# Patient Record
Sex: FEMALE | Race: WHITE | NOT HISPANIC OR LATINO | Employment: OTHER | URBAN - METROPOLITAN AREA
[De-identification: names, ages, dates, MRNs, and addresses within clinical notes are randomized per-mention and may not be internally consistent; named-entity substitution may affect disease eponyms.]

---

## 2017-06-19 LAB
CHOLEST SERPL-MSCNC: 154 MG/DL (ref 0–200)
HDLC SERPL-MCNC: 60 MG/DL (ref 35–70)
LDL/HDL RATIO: 1.2 (ref 1–3)
LDLC SERPL CALC-MCNC: 72 MG/DL (ref 0–100)
TRIGL SERPL-MCNC: 109 MG/DL (ref 0–150)

## 2019-02-14 DIAGNOSIS — Z12.31 ENCOUNTER FOR SCREENING MAMMOGRAM FOR MALIGNANT NEOPLASM OF BREAST: Primary | ICD-10-CM

## 2019-05-31 ENCOUNTER — OFFICE VISIT (OUTPATIENT)
Dept: FAMILY MEDICINE | Facility: CLINIC | Age: 84
End: 2019-05-31
Payer: MEDICARE

## 2019-05-31 VITALS
HEIGHT: 64 IN | BODY MASS INDEX: 22.2 KG/M2 | HEART RATE: 66 BPM | OXYGEN SATURATION: 97 % | TEMPERATURE: 98 F | SYSTOLIC BLOOD PRESSURE: 156 MMHG | WEIGHT: 130 LBS | DIASTOLIC BLOOD PRESSURE: 74 MMHG

## 2019-05-31 DIAGNOSIS — R53.83 FATIGUE, UNSPECIFIED TYPE: ICD-10-CM

## 2019-05-31 DIAGNOSIS — H91.90 DECREASED HEARING, UNSPECIFIED LATERALITY: ICD-10-CM

## 2019-05-31 DIAGNOSIS — F51.01 PRIMARY INSOMNIA: Primary | ICD-10-CM

## 2019-05-31 DIAGNOSIS — M05.711 RHEUMATOID ARTHRITIS INVOLVING RIGHT SHOULDER WITH POSITIVE RHEUMATOID FACTOR: ICD-10-CM

## 2019-05-31 DIAGNOSIS — E03.9 ACQUIRED HYPOTHYROIDISM: ICD-10-CM

## 2019-05-31 PROCEDURE — 99214 PR OFFICE/OUTPT VISIT, EST, LEVL IV, 30-39 MIN: ICD-10-PCS | Mod: 25,S$GLB,, | Performed by: FAMILY MEDICINE

## 2019-05-31 PROCEDURE — 99214 OFFICE O/P EST MOD 30 MIN: CPT | Mod: 25,S$GLB,, | Performed by: FAMILY MEDICINE

## 2019-05-31 PROCEDURE — 96372 THER/PROPH/DIAG INJ SC/IM: CPT | Mod: S$GLB,,, | Performed by: FAMILY MEDICINE

## 2019-05-31 PROCEDURE — 96372 PR INJECTION,THERAP/PROPH/DIAG2ST, IM OR SUBCUT: ICD-10-PCS | Mod: S$GLB,,, | Performed by: FAMILY MEDICINE

## 2019-05-31 RX ORDER — NAPROXEN SODIUM 220 MG/1
TABLET, FILM COATED ORAL
COMMUNITY

## 2019-05-31 RX ORDER — TAMSULOSIN HYDROCHLORIDE 0.4 MG/1
1 CAPSULE ORAL DAILY
COMMUNITY

## 2019-05-31 RX ORDER — TRAZODONE HYDROCHLORIDE 100 MG/1
100 TABLET ORAL NIGHTLY
Qty: 30 TABLET | Refills: 5 | Status: SHIPPED | OUTPATIENT
Start: 2019-05-31 | End: 2020-01-27

## 2019-05-31 RX ORDER — DORZOLAMIDE HYDROCHLORIDE AND TIMOLOL MALEATE 20; 5 MG/ML; MG/ML
SOLUTION/ DROPS OPHTHALMIC
Refills: 4 | COMMUNITY
Start: 2019-05-04

## 2019-05-31 RX ORDER — CARVEDILOL 12.5 MG/1
1 TABLET ORAL 2 TIMES DAILY
COMMUNITY

## 2019-05-31 RX ORDER — CYANOCOBALAMIN 1000 UG/ML
1000 INJECTION, SOLUTION INTRAMUSCULAR; SUBCUTANEOUS
Status: COMPLETED | OUTPATIENT
Start: 2019-05-31 | End: 2019-05-31

## 2019-05-31 RX ADMIN — CYANOCOBALAMIN 1000 MCG: 1000 INJECTION, SOLUTION INTRAMUSCULAR; SUBCUTANEOUS at 09:05

## 2019-05-31 NOTE — PROGRESS NOTES
Subjective:       Patient ID: Angelia Anderson is a 88 y.o. female.    Chief Complaint: Follow-up    88-year-old female in for follow-up.  Patient complains of feeling tired and having no energy.  She does have a history of hypothyroidism and has been off her thyroid medication because of side effects so we will need to check her thyroid levels today.  She is also requesting a referral to see a rheumatologist due to history of rheumatoid arthritis she has been with severe pain in both hands and the right shoulder.  She was seen a rheumatologist in the past but she stopped seeing him about 1 year ago.  She also has a history of iron deficiency anemia but she sees a hematologist and her labs done a couple months ago showed that her anemia was well controlled she according to patient's daughter.  She also has been with trouble sleeping and is asking for medication to help her sleep.  Patient's daughter is also requesting a referral to Ear Nose and Throat specialist to have her hearing checked because she states that her mom's hearing is worsening.    Review of Systems   Constitutional: Positive for fatigue. Negative for fever.   HENT: Negative for ear pain, postnasal drip, rhinorrhea, sinus pain and sore throat.    Eyes: Negative for redness.   Respiratory: Negative for cough, chest tightness, shortness of breath and wheezing.    Cardiovascular: Negative for chest pain, palpitations and leg swelling.   Gastrointestinal: Negative for constipation, diarrhea, nausea and vomiting.   Genitourinary: Negative for difficulty urinating and dysuria.   Musculoskeletal: Positive for arthralgias and joint swelling.   Skin: Negative for rash.   Neurological: Negative for dizziness.   Psychiatric/Behavioral: Positive for sleep disturbance.       Objective:      Physical Exam   Constitutional: She is oriented to person, place, and time. She appears well-developed and well-nourished.   HENT:   Head: Normocephalic and atraumatic.   Right  Ear: Decreased hearing is noted.   Left Ear: Decreased hearing is noted.   Nose: Nose normal.   Mouth/Throat: Oropharynx is clear and moist and mucous membranes are normal.   Eyes: Pupils are equal, round, and reactive to light. Conjunctivae and EOM are normal.   Neck: Normal range of motion. Neck supple.   Cardiovascular: Normal rate, regular rhythm and normal heart sounds.   Pulmonary/Chest: Breath sounds normal. She has no wheezes. She has no rales.   Abdominal: Soft. Bowel sounds are normal. She exhibits no distension and no mass. There is no tenderness. There is no guarding.   Musculoskeletal: Normal range of motion. She exhibits no edema or tenderness.   Neurological: She is alert and oriented to person, place, and time. No cranial nerve deficit.   Skin: Skin is warm and dry. No rash noted. No erythema.   Psychiatric: She has a normal mood and affect. Her behavior is normal.   Nursing note and vitals reviewed.      Assessment:       1. Primary insomnia    2. Fatigue, unspecified type    3. Acquired hypothyroidism    4. Rheumatoid arthritis involving right shoulder with positive rheumatoid factor    5. Decreased hearing, unspecified laterality        Plan:     I will check labs in addition to checking thyroid labs I will also check labs for rheumatoid arthritis due to patient desiring a referral and I have no updated labs are status of this rheumatoid arthritis.  I will wait until labs have been receive before sending a referral to rheumatologist.   patient will be started on trazodone 100 mg at bedtime for insomnia.  She will also be referred to ENT for further evaluation of hearing.  She will be given a vitamin B12 injection today in clinic.  Follow-up in 6 months.

## 2019-06-01 LAB
ALBUMIN SERPL-MCNC: 4.1 G/DL (ref 3.5–5.2)
ALBUMIN/GLOB SERPL ELPH: 1.3 {RATIO} (ref 1–2.7)
ALP ISOS SERPL LEV INH-CCNC: 84 IU/L (ref 35–105)
ALT (SGPT): 12 U/L (ref 0–33)
ANION GAP SERPL CALC-SCNC: 7 MMOL/L (ref 8–17)
AST SERPL-CCNC: 19 U/L (ref 0–32)
BILIRUBIN, TOTAL: 0.27 MG/DL (ref 0–1.2)
BUN/CREAT SERPL: 19.6 (ref 6–20)
CALCIUM SERPL-MCNC: 9.7 MG/DL (ref 8.6–10.2)
CARBON DIOXIDE, CO2: 32 MMOL/L (ref 22–29)
CHLORIDE: 105 MMOL/L (ref 98–107)
CREAT SERPL-MCNC: 0.69 MG/DL (ref 0.5–0.9)
CRP QUALITATIVE: NEGATIVE MG/L
CRP QUANTITATIVE: 1.2 MG/L
GFR ESTIMATION: 80.29
GLOBULIN: 3.2 G/DL (ref 1.5–4.5)
GLUCOSE: 100 MG/DL (ref 82–115)
POTASSIUM: 4.7 MMOL/L (ref 3.5–5.1)
PROT SNV-MCNC: 7.3 G/DL (ref 6.4–8.3)
SED RATE (WESTERGREN): 8 MM/HR (ref 0–30)
SODIUM: 144 MMOL/L (ref 136–145)
T4, FREE: 0.9 NG/DL (ref 0.93–1.7)
TSH SERPL DL<=0.005 MIU/L-ACNC: 5.8 UIU/ML (ref 0.27–4.2)
UREA NITROGEN (BUN): 13.5 MG/DL (ref 8–23)

## 2019-06-04 ENCOUNTER — TELEPHONE (OUTPATIENT)
Dept: FAMILY MEDICINE | Facility: CLINIC | Age: 84
End: 2019-06-04

## 2019-06-04 DIAGNOSIS — M05.711 RHEUMATOID ARTHRITIS INVOLVING RIGHT SHOULDER WITH POSITIVE RHEUMATOID FACTOR: Primary | ICD-10-CM

## 2019-06-19 ENCOUNTER — OFFICE VISIT (OUTPATIENT)
Dept: FAMILY MEDICINE | Facility: CLINIC | Age: 84
End: 2019-06-19
Payer: MEDICARE

## 2019-06-19 VITALS
BODY MASS INDEX: 24.89 KG/M2 | DIASTOLIC BLOOD PRESSURE: 72 MMHG | OXYGEN SATURATION: 96 % | WEIGHT: 145 LBS | SYSTOLIC BLOOD PRESSURE: 147 MMHG | TEMPERATURE: 97 F | HEART RATE: 77 BPM

## 2019-06-19 DIAGNOSIS — J32.8 OTHER CHRONIC SINUSITIS: ICD-10-CM

## 2019-06-19 DIAGNOSIS — J06.9 UPPER RESPIRATORY INFECTION WITH COUGH AND CONGESTION: Primary | ICD-10-CM

## 2019-06-19 PROBLEM — M72.0 DUPUYTREN'S DISEASE OF PALM: Status: ACTIVE | Noted: 2019-06-19

## 2019-06-19 PROBLEM — M72.0 DUPUYTREN'S DISEASE OF PALM: Status: RESOLVED | Noted: 2019-06-19 | Resolved: 2019-06-19

## 2019-06-19 PROCEDURE — 99213 OFFICE O/P EST LOW 20 MIN: CPT | Mod: 25,S$GLB,, | Performed by: NURSE PRACTITIONER

## 2019-06-19 PROCEDURE — 96372 PR INJECTION,THERAP/PROPH/DIAG2ST, IM OR SUBCUT: ICD-10-PCS | Mod: S$GLB,,, | Performed by: NURSE PRACTITIONER

## 2019-06-19 PROCEDURE — 99213 PR OFFICE/OUTPT VISIT, EST, LEVL III, 20-29 MIN: ICD-10-PCS | Mod: 25,S$GLB,, | Performed by: NURSE PRACTITIONER

## 2019-06-19 PROCEDURE — 96372 THER/PROPH/DIAG INJ SC/IM: CPT | Mod: S$GLB,,, | Performed by: NURSE PRACTITIONER

## 2019-06-19 RX ORDER — ATORVASTATIN CALCIUM 40 MG/1
TABLET, FILM COATED ORAL
COMMUNITY
End: 2019-08-27

## 2019-06-19 RX ORDER — AZITHROMYCIN 250 MG/1
TABLET, FILM COATED ORAL
Qty: 6 TABLET | Refills: 0 | Status: SHIPPED | OUTPATIENT
Start: 2019-06-19 | End: 2019-06-24

## 2019-06-19 RX ORDER — METHYLPREDNISOLONE ACETATE 40 MG/ML
40 INJECTION, SUSPENSION INTRA-ARTICULAR; INTRALESIONAL; INTRAMUSCULAR; SOFT TISSUE
Status: COMPLETED | OUTPATIENT
Start: 2019-06-19 | End: 2019-06-19

## 2019-06-19 RX ORDER — MECLIZINE HYDROCHLORIDE 25 MG/1
TABLET ORAL
Refills: 0 | COMMUNITY
Start: 2019-06-09 | End: 2019-06-19

## 2019-06-19 RX ORDER — PREDNISONE 10 MG/1
TABLET ORAL
COMMUNITY
Start: 2019-06-12 | End: 2019-08-27

## 2019-06-19 RX ORDER — ERYTHROMYCIN 5 MG/G
OINTMENT OPHTHALMIC
COMMUNITY

## 2019-06-19 RX ORDER — BENZONATATE 200 MG/1
200 CAPSULE ORAL EVERY 8 HOURS
Refills: 0 | COMMUNITY
Start: 2019-06-09 | End: 2019-08-27

## 2019-06-19 RX ORDER — GUAIFENESIN AND PHENYLEPHRINE HCL 385; 10 MG/1; MG/1
TABLET ORAL
Refills: 0 | COMMUNITY
Start: 2019-06-09 | End: 2019-08-27

## 2019-06-19 RX ADMIN — METHYLPREDNISOLONE ACETATE 40 MG: 40 INJECTION, SUSPENSION INTRA-ARTICULAR; INTRALESIONAL; INTRAMUSCULAR; SOFT TISSUE at 10:06

## 2019-06-19 NOTE — PROGRESS NOTES
Clinic Note  6/19/2019      Subjective:       Patient ID:  Angelia is a 88 y.o. female being seen for an established sick visit.    Chief Complaint: URI and Cough    HPI      Angelia Anderson is a 88 y.o. female who presents for evaluation of upper respiratory infection. Symptoms include: congestion, cough and post nasal drip. Onset of symptoms was 2 weeks ago. Symptoms have been gradually improving since that time. Past history is significant for chronic sinusitis and URI. Patient is a non-smoker.  Upper Respiratory Infection  Patient complains of symptoms of a URI. Symptoms include congestion, cough described as post nasal drip productive cough with yellow and green colored sputum. Onset of symptoms was 2 weeks ago, and has been only slightly gradually improving since that time. States she went to urgent care and was only given meclizine for dizziness, which made her symptoms worse. States she self discontinued meclizine. Treatment to date: none. Denies OTC treatment.      Review of Systems   Constitutional: Positive for malaise/fatigue. Negative for chills and diaphoresis.        Chronic fatigue.   HENT: Positive for congestion. Negative for ear discharge, ear pain, sinus pain and tinnitus.         Chronic sinusitis and recurrent URI. Referred to ENT per Dr. Mc, awaiting appointment.   Eyes: Negative for discharge.   Respiratory: Positive for cough, sputum production and wheezing.         Cough, yellow/green sputum x 2 weeks.   Gastrointestinal: Negative for diarrhea, nausea and vomiting.   Musculoskeletal: Positive for joint pain and myalgias.        Chronic, RA. Awaiting appointment with Dr. Caballero   Neurological: Positive for headaches.        Occasional headaches, relieved with Anicin OTC.   Psychiatric/Behavioral: The patient has insomnia.         Chronic. Stable with medications.          Medication List with Changes/Refills   New Medications    AZITHROMYCIN (Z-WALTER) 250 MG TABLET    Take 2 tablets by  "mouth on day 1; Take 1 tablet by mouth on days 2-5   Current Medications    ASPIRIN 81 MG CHEW    aspirin 81 mg chewable tablet   Chew 1 tablet every day by oral route.    ATORVASTATIN (LIPITOR) 40 MG TABLET    atorvastatin 40 mg tablet    BENZONATATE (TESSALON) 200 MG CAPSULE    Take 200 mg by mouth every 8 (eight) hours.    CARVEDILOL (COREG) 12.5 MG TABLET    1 tablet 2 (two) times daily.    DECONEX IR  MG TAB    TAKE 1 TABLET BY MOUTH 3-4 TIMES DAILY    DORZOLAMIDE-TIMOLOL 2-0.5% (COSOPT) 22.3-6.8 MG/ML OPHTHALMIC SOLUTION    INSTILL 1 DROP INTO RIGHT EYE TWICE A DAY    ERYTHROMYCIN (ROMYCIN) OPHTHALMIC OINTMENT    erythromycin 5 mg/gram (0.5 %) eye ointment    PREDNISONE (DELTASONE) 10 MG TABLET        TAMSULOSIN (FLOMAX) 0.4 MG CAP    1 tablet once daily.    TRAZODONE (DESYREL) 100 MG TABLET    Take 1 tablet (100 mg total) by mouth every evening.   Discontinued Medications    MECLIZINE (ANTIVERT) 25 MG TABLET    TAKE 1 TABLET BY MOUTH EVERY 6-8 HOURS       Patient Active Problem List   Diagnosis    Other chronic sinusitis    Upper respiratory infection with cough and congestion           Objective:      BP (!) 147/72 (BP Location: Left arm, Patient Position: Sitting, BP Method: Medium (Automatic))   Pulse 77   Temp 97.2 °F (36.2 °C) (Temporal)   Wt 65.8 kg (145 lb)   SpO2 96%   BMI 24.89 kg/m²   Estimated body mass index is 24.89 kg/m² as calculated from the following:    Height as of 5/31/19: 5' 4" (1.626 m).    Weight as of this encounter: 65.8 kg (145 lb).  Physical Exam   Constitutional: She is oriented to person, place, and time and well-developed, well-nourished, and in no distress. No distress.   HENT:   Head: Normocephalic and atraumatic.   Right Ear: External ear normal.   Left Ear: External ear normal.   Nose: Nose normal.   Mouth/Throat: Oropharynx is clear and moist. No oropharyngeal exudate.   Eyes: Conjunctivae are normal. Right eye exhibits no discharge. Left eye exhibits no " discharge. No scleral icterus.   Neck: Normal range of motion. Neck supple. No JVD present. No tracheal deviation present.   Cardiovascular: Normal rate, regular rhythm and normal heart sounds.   Pulmonary/Chest: Effort normal and breath sounds normal. No respiratory distress. She has no wheezes. She has no rales.   Abdominal: Soft. Bowel sounds are normal. She exhibits no distension. There is no tenderness.   Neurological: She is alert and oriented to person, place, and time. Gait normal. GCS score is 15.   Skin: Skin is warm and dry. She is not diaphoretic. No erythema. No pallor.   Psychiatric: Mood, affect and judgment normal.         Assessment and Plan:   Upper respiratory infection    Zithromax prescribed.   Steroids intramuscular given: Depo Medrol 40mg IM in clinic.  Keep appointment with ENT  Keep appointment with Rheumatologist.  Follow up with Dr. Mc as scheduled and as needed.      Problem List Items Addressed This Visit        ENT    Other chronic sinusitis    Upper respiratory infection with cough and congestion - Primary    Relevant Medications    methylPREDNISolone acetate injection 40 mg    azithromycin (Z-WALTER) 250 MG tablet          Follow Up:   Follow up if symptoms worsen or fail to improve.    Other Orders Placed This Visit:  No orders of the defined types were placed in this encounter.        Aline Cohen    Problem List Items Addressed This Visit        ENT    Other chronic sinusitis    Upper respiratory infection with cough and congestion - Primary    Relevant Medications    methylPREDNISolone acetate injection 40 mg    azithromycin (Z-WALTER) 250 MG tablet

## 2019-06-19 NOTE — PATIENT INSTRUCTIONS
Preventing Common Respiratory Infections  Respiratory infections such as colds and influenza (the flu) are common in winter. These infections are often caused by viruses. They may share some symptoms, but not all respiratory infections are the same. Some make you more sick than others. You can take steps to prevent common respiratory infections. And if you get sick, you can take care of yourself to keep the infection from getting worse.    What is a cold?  · Symptoms include runny nose, coughing and sneezing, and sore throat. Cold symptoms tend to be milder than flu symptoms.  · Symptoms tend to come on slowly. They last for a few days to about a week.  · With a cold, you can still do most of the things you usually do.  What is the flu?  · Symptoms include fever, headache, fatigue, cough, sore throat, runny nose, and muscle aches. Children may have upset stomach and vomiting, but adults usually dont.  · Symptoms tend to come on quickly. Some, such as fatigue and cough, can last a few weeks.  · With the flu, you may feel worn out and not able to do normal activities.  · Its most likely NOT the flu if an adult has vomiting or diarrhea for a day or two. This so-called stomach flu is probably a GI (gastrointestinal) infection.  When the infection gets worse  Without proper care, a respiratory infection can get worse. It can lead to serious complications and death. If you arent getting better, call your healthcare provider. Complications can include:  · Bronchitis (infection of the airways that leads to shortness of breath and coughing up thick yellow or green mucus)  · Pneumonia (infection of the lungs in which fluid and mucus settle in the lungs, making breathing difficult)  · Worsening of chronic conditions such as heart failure, chronic lung disease, asthma, or diabetes  · Severe dehydration (loss of fluids)  · Sinus problems  · Ear infections   Get a flu vaccine  A flu vaccine protects you from influenza  (but not other colds or infections). Get a vaccine each fall, before flu season starts. This can be done at a clinic, healthcare providers office, drugstore, senior center, or through your workplace.  Get pneumococcal vaccines  Pneumonia can be a complication of influenza. There are 2 pneumococcal pneumonia vaccines that protect against many types of pneumonia. Talk with your healthcare provider about these important vaccines.   Keep germs from spreading  No one likes getting sick. To protect yourself and others from cold and flu germs:  · Wash your hands often. Use alcohol-based hand  when you dont have access to soap and water.  · Dont touch your eyes, nose, and mouth. This may help you keep germs out of your body.  · Try to avoid people with respiratory infections. You may want to stay out of crowds during flu season (winter).  · Ask your healthcare provider if you should get a pneumonia vaccination.  How to wash your hands  · Use warm water and plenty of soap. Work up a good lather.  · Clean your whole hand, under your nails, between your fingers, and up your wrists. Wash for at least 15 to 20 seconds. Dont just wipe--rub well.  · Rinse. Let the water run down your fingertips, not up your wrists.  · In a public restroom, use a paper towel to turn off the faucet and open the door.   Date Last Reviewed: 12/1/2016  © 4403-0498 [employer] [address] [place] [address], [place] [address]. All rights reserved. This information is not intended as a substitute for professional medical care. Always follow your healthcare professional's instructions.

## 2019-07-24 ENCOUNTER — OFFICE VISIT (OUTPATIENT)
Dept: FAMILY MEDICINE | Facility: CLINIC | Age: 84
End: 2019-07-24
Payer: MEDICARE

## 2019-07-24 VITALS
SYSTOLIC BLOOD PRESSURE: 177 MMHG | OXYGEN SATURATION: 97 % | TEMPERATURE: 98 F | BODY MASS INDEX: 24.92 KG/M2 | DIASTOLIC BLOOD PRESSURE: 71 MMHG | WEIGHT: 146 LBS | HEIGHT: 64 IN | HEART RATE: 69 BPM

## 2019-07-24 DIAGNOSIS — I10 ESSENTIAL HYPERTENSION: Primary | ICD-10-CM

## 2019-07-24 DIAGNOSIS — D50.0 IRON DEFICIENCY ANEMIA DUE TO CHRONIC BLOOD LOSS: ICD-10-CM

## 2019-07-24 DIAGNOSIS — E03.9 ACQUIRED HYPOTHYROIDISM: ICD-10-CM

## 2019-07-24 PROCEDURE — 99213 PR OFFICE/OUTPT VISIT, EST, LEVL III, 20-29 MIN: ICD-10-PCS | Mod: S$GLB,,, | Performed by: FAMILY MEDICINE

## 2019-07-24 PROCEDURE — 99213 OFFICE O/P EST LOW 20 MIN: CPT | Mod: S$GLB,,, | Performed by: FAMILY MEDICINE

## 2019-07-24 NOTE — PROGRESS NOTES
Subjective:       Patient ID: Angelia Anderson is a 88 y.o. female.    Chief Complaint: Follow-up    89 yo female in for follow up.  She states that she has has been doing well.  She states that she only takes the carvedilol twice a day and the iron pill daily.  She     Review of Systems   Constitutional: Negative for fever.   HENT: Negative for ear pain, postnasal drip, rhinorrhea, sinus pain and sore throat.    Eyes: Negative for redness.   Respiratory: Negative for cough, chest tightness, shortness of breath and wheezing.    Cardiovascular: Negative for chest pain, palpitations and leg swelling.   Gastrointestinal: Negative for constipation, diarrhea, nausea and vomiting.   Genitourinary: Negative for difficulty urinating and dysuria.   Musculoskeletal: Negative for arthralgias.   Skin: Negative for rash.   Neurological: Negative for dizziness.       Objective:      Physical Exam   Constitutional: She is oriented to person, place, and time. Vital signs are normal. She appears well-developed and well-nourished.   HENT:   Head: Normocephalic and atraumatic.   Right Ear: Hearing and tympanic membrane normal.   Left Ear: Hearing and tympanic membrane normal.   Nose: Nose normal.   Mouth/Throat: Oropharynx is clear and moist.   Eyes: Pupils are equal, round, and reactive to light. Conjunctivae and EOM are normal.   Neck: Normal range of motion. Neck supple.   Cardiovascular: Normal rate, regular rhythm and normal heart sounds.   Pulmonary/Chest: Effort normal and breath sounds normal. She has no wheezes. She has no rales.   Abdominal: Soft. Normal appearance and bowel sounds are normal. She exhibits no distension and no mass. There is no tenderness. There is no guarding.   Musculoskeletal: Normal range of motion. She exhibits no edema.        Left hand: She exhibits tenderness and bony tenderness.   Neurological: She is alert and oriented to person, place, and time. No cranial nerve deficit.   Skin: Skin is warm and  dry. No rash noted. No erythema.   Psychiatric: She has a normal mood and affect. Her behavior is normal.   Nursing note and vitals reviewed.      Assessment:       1. Essential hypertension    2. Iron deficiency anemia due to chronic blood loss    3. Acquired hypothyroidism        Plan:     Hypertension is chronic and controlled on carvedilol.  Anemia is chronic and controlled with daily iron supplement.  She is subclinical but will monitor for now because of side effects from too much medication previously.

## 2019-08-26 NOTE — PROGRESS NOTES
Clinic Note  8/27/2019      Subjective:       Patient ID:  Angelia is a 88 y.o. female being seen for an established visit.    Chief Complaint: Follow-up (sick visit)    HPI  Angelia Anderson is a 88 y.o. female who presents for evaluation of chronic sinus problems. Symptoms include cough, post nasal drip, rhinorrhea and throat clearing. Patient has had approximately 6 acute sinus infections in the past year. Patient admits to a history of allergic rhinitis. Patient denies chronic use of OTC nasal decongestant sprays. Previous treatment has included nasal steroids and oral decongestants, which have given no relief. Patient is a non-smoker.      Review of Systems   Constitutional: Negative for chills, diaphoresis, fever and malaise/fatigue.   HENT: Positive for congestion, hearing loss and sore throat. Negative for ear discharge, ear pain and sinus pain.         Bilateral hearing loss, followed by ENT. Nasal congestion, clear, chronic.   Eyes: Positive for discharge. Negative for double vision, pain and redness.        Blind in left eye, chronic. Clear, watery discharge, chronic.   Respiratory: Positive for cough and sputum production. Negative for shortness of breath and wheezing.         Cough, worse in the morning and at night. Sputum, clear and thick.   Neurological: Positive for headaches.        Sinus headache, chronic problem, stable with OTC pain relief.          Medication List with Changes/Refills   New Medications    LORATADINE (CLARITIN) 10 MG TABLET    Take 1 tablet (10 mg total) by mouth once daily.    METHYLPREDNISOLONE (MEDROL DOSEPACK) 4 MG TABLET    use as directed    MONTELUKAST (SINGULAIR) 10 MG TABLET    Take 1 tablet (10 mg total) by mouth every evening.   Current Medications    ASPIRIN 81 MG CHEW    aspirin 81 mg chewable tablet   Chew 1 tablet every day by oral route.    CARVEDILOL (COREG) 12.5 MG TABLET    1 tablet 2 (two) times daily.    DECONEX IR  MG TAB    TAKE 1 TABLET BY MOUTH 3-4  "TIMES DAILY    DORZOLAMIDE-TIMOLOL 2-0.5% (COSOPT) 22.3-6.8 MG/ML OPHTHALMIC SOLUTION    INSTILL 1 DROP INTO RIGHT EYE TWICE A DAY    ERYTHROMYCIN (ROMYCIN) OPHTHALMIC OINTMENT    erythromycin 5 mg/gram (0.5 %) eye ointment    FERROUS SULFATE (IRON ORAL)    Take 10 mg by mouth once daily.    TAMSULOSIN (FLOMAX) 0.4 MG CAP    1 tablet once daily.    TRAZODONE (DESYREL) 100 MG TABLET    Take 1 tablet (100 mg total) by mouth every evening.   Discontinued Medications    ATORVASTATIN (LIPITOR) 40 MG TABLET    atorvastatin 40 mg tablet    BENZONATATE (TESSALON) 200 MG CAPSULE    Take 200 mg by mouth every 8 (eight) hours.    PREDNISONE (DELTASONE) 10 MG TABLET           Patient Active Problem List   Diagnosis    Other chronic sinusitis    Upper respiratory infection with cough and congestion    Essential hypertension           Objective:      BP (!) 146/79 (BP Location: Left arm, Patient Position: Sitting, BP Method: Medium (Automatic))   Pulse 70   Temp 97.9 °F (36.6 °C)   Ht 5' 4" (1.626 m)   Wt 67.3 kg (148 lb 6 oz)   SpO2 98%   BMI 25.47 kg/m²   Estimated body mass index is 25.47 kg/m² as calculated from the following:    Height as of this encounter: 5' 4" (1.626 m).    Weight as of this encounter: 67.3 kg (148 lb 6 oz).  Physical Exam   Constitutional: She is oriented to person, place, and time and well-developed, well-nourished, and in no distress. No distress.   HENT:   Head: Normocephalic and atraumatic.   Right Ear: External ear normal.   Left Ear: External ear normal.   Nose: Mucosal edema and rhinorrhea present. Right sinus exhibits no maxillary sinus tenderness and no frontal sinus tenderness. Left sinus exhibits no maxillary sinus tenderness and no frontal sinus tenderness.   Mouth/Throat: Uvula is midline and mucous membranes are normal. Posterior oropharyngeal erythema present. No posterior oropharyngeal edema.   Eyes: Right eye exhibits no discharge. Left eye exhibits no discharge. "   Cardiovascular: Normal rate, regular rhythm and normal heart sounds.   No murmur heard.  Pulmonary/Chest: Effort normal and breath sounds normal. No respiratory distress. She has no wheezes.   Neurological: She is alert and oriented to person, place, and time. Gait normal. GCS score is 15.   Skin: Skin is warm and dry. No rash noted. She is not diaphoretic. No erythema.   Psychiatric: Mood, memory, affect and judgment normal.         Assessment and Plan:   Chronic sinusitis    Make appointment with Dr. Tsai ENT  OTC Claritin 10 mg by mouth daily  Medrol Dose pack as prescribed  Singular 10 mg by mouth at bedtime  Follow up as scheduled and as needed.      Problem List Items Addressed This Visit        ENT    Other chronic sinusitis - Primary    Relevant Medications    montelukast (SINGULAIR) 10 mg tablet    methylPREDNISolone (MEDROL DOSEPACK) 4 mg tablet    loratadine (CLARITIN) 10 mg tablet          Follow Up:   Follow up if symptoms worsen or fail to improve.    Other Orders Placed This Visit:  No orders of the defined types were placed in this encounter.        Aline Cohen    Problem List Items Addressed This Visit        ENT    Other chronic sinusitis - Primary    Relevant Medications    montelukast (SINGULAIR) 10 mg tablet    methylPREDNISolone (MEDROL DOSEPACK) 4 mg tablet    loratadine (CLARITIN) 10 mg tablet

## 2019-08-27 ENCOUNTER — OFFICE VISIT (OUTPATIENT)
Dept: FAMILY MEDICINE | Facility: CLINIC | Age: 84
End: 2019-08-27
Payer: MEDICARE

## 2019-08-27 VITALS
BODY MASS INDEX: 25.33 KG/M2 | OXYGEN SATURATION: 98 % | HEIGHT: 64 IN | DIASTOLIC BLOOD PRESSURE: 79 MMHG | SYSTOLIC BLOOD PRESSURE: 146 MMHG | TEMPERATURE: 98 F | WEIGHT: 148.38 LBS | HEART RATE: 70 BPM

## 2019-08-27 DIAGNOSIS — J32.8 OTHER CHRONIC SINUSITIS: Primary | ICD-10-CM

## 2019-08-27 PROCEDURE — 99212 OFFICE O/P EST SF 10 MIN: CPT | Mod: S$GLB,,, | Performed by: NURSE PRACTITIONER

## 2019-08-27 PROCEDURE — 99212 PR OFFICE/OUTPT VISIT, EST, LEVL II, 10-19 MIN: ICD-10-PCS | Mod: S$GLB,,, | Performed by: NURSE PRACTITIONER

## 2019-08-27 RX ORDER — LORATADINE 10 MG/1
10 TABLET ORAL DAILY
Qty: 30 TABLET | Refills: 5 | Status: SHIPPED | OUTPATIENT
Start: 2019-08-27 | End: 2020-01-27

## 2019-08-27 RX ORDER — MONTELUKAST SODIUM 10 MG/1
10 TABLET ORAL NIGHTLY
Qty: 30 TABLET | Refills: 5 | Status: SHIPPED | OUTPATIENT
Start: 2019-08-27 | End: 2020-01-27

## 2019-08-27 RX ORDER — METHYLPREDNISOLONE 4 MG/1
TABLET ORAL
Qty: 1 PACKAGE | Refills: 0 | Status: SHIPPED | OUTPATIENT
Start: 2019-08-27 | End: 2019-09-17

## 2019-09-05 DIAGNOSIS — J06.9 UPPER RESPIRATORY INFECTION WITH COUGH AND CONGESTION: Primary | ICD-10-CM

## 2019-09-05 RX ORDER — GUAIFENESIN/DEXTROMETHORPHAN 100-10MG/5
10 SYRUP ORAL EVERY 6 HOURS PRN
Qty: 236 ML | Refills: 0 | Status: SHIPPED | OUTPATIENT
Start: 2019-09-05 | End: 2019-09-15

## 2019-09-05 RX ORDER — AMOXICILLIN AND CLAVULANATE POTASSIUM 875; 125 MG/1; MG/1
1 TABLET, FILM COATED ORAL 2 TIMES DAILY
Qty: 20 TABLET | Refills: 0 | Status: SHIPPED | OUTPATIENT
Start: 2019-09-05 | End: 2019-09-15

## 2019-09-09 ENCOUNTER — TELEPHONE (OUTPATIENT)
Dept: FAMILY MEDICINE | Facility: CLINIC | Age: 84
End: 2019-09-09

## 2019-09-09 DIAGNOSIS — R53.1 WEAKNESS GENERALIZED: ICD-10-CM

## 2019-09-09 DIAGNOSIS — M05.711 RHEUMATOID ARTHRITIS INVOLVING RIGHT SHOULDER WITH POSITIVE RHEUMATOID FACTOR: Primary | ICD-10-CM

## 2019-09-09 NOTE — TELEPHONE ENCOUNTER
----- Message from Ana Liriano sent at 9/9/2019 11:37 AM CDT -----  Contact: daughter Kika [phone]  When patient was seen in July with Dr Mc they discussed having [facility] for her mom since she doesn't want to get out of bed

## 2019-09-24 ENCOUNTER — TELEPHONE (OUTPATIENT)
Dept: FAMILY MEDICINE | Facility: CLINIC | Age: 84
End: 2019-09-24

## 2019-09-24 NOTE — TELEPHONE ENCOUNTER
----- Message from Ana Liriano sent at 9/23/2019  3:35 PM CDT -----  Contact: Kika daughter [phone]  Patients daughter had [employer] tickets but had to cancel since her mom was sick & has Alzheimers. Wanted to know if Dr Mc could write a note stating that she needed to care for her mom

## 2019-09-25 ENCOUNTER — OFFICE VISIT (OUTPATIENT)
Dept: FAMILY MEDICINE | Facility: CLINIC | Age: 84
End: 2019-09-25
Payer: MEDICARE

## 2019-09-25 VITALS
TEMPERATURE: 97 F | BODY MASS INDEX: 25.61 KG/M2 | SYSTOLIC BLOOD PRESSURE: 143 MMHG | HEIGHT: 64 IN | OXYGEN SATURATION: 97 % | DIASTOLIC BLOOD PRESSURE: 67 MMHG | HEART RATE: 70 BPM | WEIGHT: 150 LBS

## 2019-09-25 DIAGNOSIS — J32.8 OTHER CHRONIC SINUSITIS: Primary | ICD-10-CM

## 2019-09-25 PROCEDURE — 99213 PR OFFICE/OUTPT VISIT, EST, LEVL III, 20-29 MIN: ICD-10-PCS | Mod: ICN,CMP,S$GLB, | Performed by: NURSE PRACTITIONER

## 2019-09-25 PROCEDURE — 99213 OFFICE O/P EST LOW 20 MIN: CPT | Mod: ICN,CMP,S$GLB, | Performed by: NURSE PRACTITIONER

## 2019-09-25 NOTE — PROGRESS NOTES
Clinic Note  9/25/2019      Subjective:       Patient ID:  Angelia is a 88 y.o. female being seen for an established visit.    Chief Complaint: Fever (pt c/o fever at the highest of 102 x1.5 weeks) and Nasal Congestion    HPI  Angelia is an 88 year old female in clinic today with complaints of fever > 102 for 1 to 1.5 weeks. She is afebrile in clinic today. Reports chronic post nasal drip, chronic sinusitis, nasal congestion. Followed by ENT. Daughter states that at last visit with ENT no further medication or testing was needed at this time. Today patient denies cough or wheezing. Report generally feeling well overall.    Review of Systems   Constitutional: Negative for chills, fever, malaise/fatigue and weight loss.        Daughter reports temperature between  the last few days. Afebrile in clinic today.   HENT: Positive for congestion. Negative for ear discharge, ear pain, sinus pain and sore throat.         Nasal congestion, chronic, followed by ENT   Respiratory: Negative for cough, sputum production, shortness of breath and wheezing.    Skin: Negative for itching and rash.   Neurological: Negative for dizziness, seizures, loss of consciousness and headaches.       Medication List with Changes/Refills   Current Medications    ASPIRIN 81 MG CHEW    aspirin 81 mg chewable tablet   Chew 1 tablet every day by oral route.    CARVEDILOL (COREG) 12.5 MG TABLET    1 tablet 2 (two) times daily.    DORZOLAMIDE-TIMOLOL 2-0.5% (COSOPT) 22.3-6.8 MG/ML OPHTHALMIC SOLUTION    INSTILL 1 DROP INTO RIGHT EYE TWICE A DAY    ERYTHROMYCIN (ROMYCIN) OPHTHALMIC OINTMENT    erythromycin 5 mg/gram (0.5 %) eye ointment    FERROUS SULFATE (IRON ORAL)    Take 10 mg by mouth once daily.    LORATADINE (CLARITIN) 10 MG TABLET    Take 1 tablet (10 mg total) by mouth once daily.    MONTELUKAST (SINGULAIR) 10 MG TABLET    Take 1 tablet (10 mg total) by mouth every evening.    TAMSULOSIN (FLOMAX) 0.4 MG CAP    1 tablet once daily.     "TRAZODONE (DESYREL) 100 MG TABLET    Take 1 tablet (100 mg total) by mouth every evening.       Patient Active Problem List   Diagnosis    Other chronic sinusitis    Upper respiratory infection with cough and congestion    Essential hypertension           Objective:      BP (!) 143/67 (BP Location: Left arm, Patient Position: Sitting, BP Method: Medium (Automatic))   Pulse 70   Temp 97.3 °F (36.3 °C) (Temporal)   Ht 5' 4" (1.626 m)   Wt 68 kg (150 lb)   SpO2 97%   BMI 25.75 kg/m²   Estimated body mass index is 25.75 kg/m² as calculated from the following:    Height as of this encounter: 5' 4" (1.626 m).    Weight as of this encounter: 68 kg (150 lb).  Physical Exam   Constitutional: She is oriented to person, place, and time and well-developed, well-nourished, and in no distress. She appears not lethargic and not dehydrated. She appears healthy.  Non-toxic appearance. She does not have a sickly appearance. No distress.   HENT:   Head: Normocephalic and atraumatic.   Right Ear: External ear normal.   Mouth/Throat: Oropharynx is clear and moist. No oropharyngeal exudate.   Eyes: Conjunctivae are normal. Right eye exhibits no discharge. Left eye exhibits no discharge. No scleral icterus.   Cardiovascular: Normal rate, regular rhythm and normal heart sounds. Exam reveals no friction rub.   No murmur heard.  Pulmonary/Chest: Effort normal and breath sounds normal. No respiratory distress. She has no wheezes. She has no rales.   Neurological: She is oriented to person, place, and time. She appears not lethargic. Gait normal. GCS score is 15.   Skin: Skin is warm and dry. No rash noted. She is not diaphoretic. No erythema.   Psychiatric: Mood, memory, affect and judgment normal.   Nursing note and vitals reviewed.        Assessment and Plan:   Chronic sinusitis, viral       Encouraged increase oral hydration  Nasal saline sprays.  Nasal steroids per medication orders.  Antihistamines: trial of OTC Zyrtec QHS x 1 " month  Hold loratadine during trial period  Call office if fever consistently >101  Follow up as scheduled and as needed    Problem List Items Addressed This Visit        ENT    Other chronic sinusitis - Primary          Follow Up:   Follow up if symptoms worsen or fail to improve.    Other Orders Placed This Visit:  No orders of the defined types were placed in this encounter.        Aline Cohen    Problem List Items Addressed This Visit        ENT    Other chronic sinusitis - Primary

## 2019-10-09 ENCOUNTER — TELEPHONE (OUTPATIENT)
Dept: FAMILY MEDICINE | Facility: CLINIC | Age: 84
End: 2019-10-09

## 2019-10-09 NOTE — TELEPHONE ENCOUNTER
----- Message from Ana Liriano sent at 10/9/2019  2:04 PM CDT -----  Contact: daughter   Kika [phone]  Wants to discuss the homehealth therapist

## 2019-10-10 NOTE — TELEPHONE ENCOUNTER
Patient's daughter states that she was calling to ask about the letter you said you would write once you returned from [place]. States that it is needed to get a refund from [employer] for a ticket she purchased before her mother got ill and she couldn't leave her mother unattended. Would like to be called to pick the letter up once completed.

## 2019-10-11 ENCOUNTER — TELEPHONE (OUTPATIENT)
Dept: FAMILY MEDICINE | Facility: CLINIC | Age: 84
End: 2019-10-11

## 2019-10-11 NOTE — TELEPHONE ENCOUNTER
----- Message from Ana Liriano sent at 10/11/2019 11:31 AM CDT -----  Contact: [phone]  Manju daughter  Patient stated Dr Mc wanted to know who to make the letter attn to: [employer]

## 2019-10-23 ENCOUNTER — OFFICE VISIT (OUTPATIENT)
Dept: FAMILY MEDICINE | Facility: CLINIC | Age: 84
End: 2019-10-23
Payer: MEDICARE

## 2019-10-23 VITALS
WEIGHT: 153.5 LBS | OXYGEN SATURATION: 96 % | SYSTOLIC BLOOD PRESSURE: 139 MMHG | BODY MASS INDEX: 26.21 KG/M2 | TEMPERATURE: 98 F | DIASTOLIC BLOOD PRESSURE: 76 MMHG | HEIGHT: 64 IN | HEART RATE: 72 BPM

## 2019-10-23 DIAGNOSIS — I10 ESSENTIAL HYPERTENSION: Primary | ICD-10-CM

## 2019-10-23 DIAGNOSIS — R53.1 WEAKNESS GENERALIZED: ICD-10-CM

## 2019-10-23 DIAGNOSIS — M05.711 RHEUMATOID ARTHRITIS INVOLVING RIGHT SHOULDER WITH POSITIVE RHEUMATOID FACTOR: ICD-10-CM

## 2019-10-23 PROCEDURE — 99213 OFFICE O/P EST LOW 20 MIN: CPT | Mod: S$GLB,,, | Performed by: FAMILY MEDICINE

## 2019-10-23 PROCEDURE — 99213 PR OFFICE/OUTPT VISIT, EST, LEVL III, 20-29 MIN: ICD-10-PCS | Mod: S$GLB,,, | Performed by: FAMILY MEDICINE

## 2019-10-23 NOTE — PROGRESS NOTES
Subjective:      Patient ID: Angelia Anderson is a 88 y.o. female.    Chief Complaint: Follow-up and Hypertension    88-year-old female in for follow-up.  Patient requested to have home health for physical therapy after her last visit and it was subsequently sent to local home health.  Patient reports that physical therapy stated that she did knee any therapy when he came to evaluate her.  Patient's daughter states that the patient was just sitting on a couch and not moving and states that she have pain when she tried to get around so she thought physical therapy will be helpful and that is why they requested.  Patient's daughter states the when and purchased a treadmill on well allow her to walk on it for about 15-20 minutes every day which she feel would benefit her mother.  Patient states she has been feeling [place] well and would like a little more energy and her blood pressure has been running good when she checks it at home.      Review of Systems   Constitutional: Negative for fever.   HENT: Negative for ear pain, postnasal drip, rhinorrhea, sinus pain and sore throat.    Eyes: Negative for redness.   Respiratory: Negative for cough, chest tightness, shortness of breath and wheezing.    Cardiovascular: Negative for chest pain, palpitations and leg swelling.   Gastrointestinal: Negative for constipation, diarrhea, nausea and vomiting.   Genitourinary: Negative for difficulty urinating and dysuria.   Musculoskeletal: Positive for arthralgias and myalgias.   Skin: Negative for rash.   Neurological: Positive for weakness. Negative for dizziness.     Medication List with Changes/Refills   Current Medications    ASPIRIN 81 MG CHEW    aspirin 81 mg chewable tablet   Chew 1 tablet every day by oral route.    CARVEDILOL (COREG) 12.5 MG TABLET    1 tablet 2 (two) times daily.    DORZOLAMIDE-TIMOLOL 2-0.5% (COSOPT) 22.3-6.8 MG/ML OPHTHALMIC SOLUTION    INSTILL 1 DROP INTO RIGHT EYE TWICE A DAY    ERYTHROMYCIN (ROMYCIN)  "OPHTHALMIC OINTMENT    erythromycin 5 mg/gram (0.5 %) eye ointment    FERROUS SULFATE (IRON ORAL)    Take 10 mg by mouth once daily.    LORATADINE (CLARITIN) 10 MG TABLET    Take 1 tablet (10 mg total) by mouth once daily.    MONTELUKAST (SINGULAIR) 10 MG TABLET    Take 1 tablet (10 mg total) by mouth every evening.    TAMSULOSIN (FLOMAX) 0.4 MG CAP    1 tablet once daily.    TRAZODONE (DESYREL) 100 MG TABLET    Take 1 tablet (100 mg total) by mouth every evening.      Objective:   /76 (BP Location: Left arm, Patient Position: Sitting, BP Method: Medium (Automatic))   Pulse 72   Temp 98 °F (36.7 °C)   Ht 5' 4" (1.626 m)   Wt 69.6 kg (153 lb 8 oz)   SpO2 96%   BMI 26.35 kg/m²    Estimated body mass index is 26.35 kg/m² as calculated from the following:    Height as of this encounter: 5' 4" (1.626 m).    Weight as of this encounter: 69.6 kg (153 lb 8 oz).   Physical Exam   Constitutional: She is oriented to person, place, and time. She appears well-developed and well-nourished.   HENT:   Head: Normocephalic and atraumatic.   Right Ear: Hearing and tympanic membrane normal.   Left Ear: Hearing and tympanic membrane normal.   Nose: Nose normal.   Mouth/Throat: Uvula is midline, oropharynx is clear and moist and mucous membranes are normal.   Eyes: Pupils are equal, round, and reactive to light. Conjunctivae and EOM are normal.   Neck: Normal range of motion. Neck supple.   Cardiovascular: Normal rate, regular rhythm and normal heart sounds.   Pulmonary/Chest: Breath sounds normal. She has no wheezes. She has no rales.   Abdominal: Soft. Bowel sounds are normal. She exhibits no distension and no mass. There is no tenderness. There is no guarding.   Musculoskeletal: Normal range of motion. She exhibits no edema or tenderness.   Neurological: She is alert and oriented to person, place, and time. No cranial nerve deficit.   Skin: Skin is warm and dry. No rash noted. No erythema.   Psychiatric: She has a normal " mood and affect. Her speech is normal and behavior is normal. Judgment and thought content normal. Cognition and memory are normal.   Nursing note and vitals reviewed.    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  I will send an order to home health to have them discharged her from their services.  She is to continue her home home exercises which she be beneficial for her weakness.  She is to follow up as needed.

## 2019-10-23 NOTE — LETTER
[facility]  [address]  [place] [address]  Phone: [phone]  Fax: [phone]     2019     RE: Angelia Anderson  : 3/7/1931        To: [employer],     This letter is being written on behalf of Mrs. Angelia Anderson's daughter, Manju Canales.  She is requesting a refund for an airline ticket that was purchased for the dates of -2019.  Her mother became ill unexpectedly and she was not able to leave her unattended.     Sincerely,                    Makenzie Mc MD [employer]

## 2020-01-27 ENCOUNTER — OFFICE VISIT (OUTPATIENT)
Dept: FAMILY MEDICINE | Facility: CLINIC | Age: 85
End: 2020-01-27
Payer: MEDICARE

## 2020-01-27 VITALS
TEMPERATURE: 97 F | DIASTOLIC BLOOD PRESSURE: 80 MMHG | HEART RATE: 67 BPM | WEIGHT: 152 LBS | HEIGHT: 64 IN | BODY MASS INDEX: 25.95 KG/M2 | SYSTOLIC BLOOD PRESSURE: 164 MMHG | OXYGEN SATURATION: 96 %

## 2020-01-27 DIAGNOSIS — R05.9 COUGH: Primary | ICD-10-CM

## 2020-01-27 DIAGNOSIS — J30.9 ALLERGIC RHINITIS, UNSPECIFIED SEASONALITY, UNSPECIFIED TRIGGER: ICD-10-CM

## 2020-01-27 PROCEDURE — 99212 OFFICE O/P EST SF 10 MIN: CPT | Mod: 25,S$GLB,, | Performed by: FAMILY MEDICINE

## 2020-01-27 PROCEDURE — 99212 PR OFFICE/OUTPT VISIT, EST, LEVL II, 10-19 MIN: ICD-10-PCS | Mod: 25,S$GLB,, | Performed by: FAMILY MEDICINE

## 2020-01-27 PROCEDURE — 1159F MED LIST DOCD IN RCRD: CPT | Mod: S$GLB,,, | Performed by: FAMILY MEDICINE

## 2020-01-27 PROCEDURE — 1159F PR MEDICATION LIST DOCUMENTED IN MEDICAL RECORD: ICD-10-PCS | Mod: S$GLB,,, | Performed by: FAMILY MEDICINE

## 2020-01-27 RX ORDER — DESLORATADINE 5 MG/1
5 TABLET ORAL DAILY
Qty: 30 TABLET | Refills: 0 | Status: SHIPPED | OUTPATIENT
Start: 2020-01-27 | End: 2020-02-19

## 2020-01-27 RX ORDER — BETAMETHASONE SODIUM PHOSPHATE AND BETAMETHASONE ACETATE 3; 3 MG/ML; MG/ML
6 INJECTION, SUSPENSION INTRA-ARTICULAR; INTRALESIONAL; INTRAMUSCULAR; SOFT TISSUE
Status: SHIPPED | OUTPATIENT
Start: 2020-01-27

## 2020-01-27 RX ORDER — BENZONATATE 100 MG/1
100 CAPSULE ORAL 3 TIMES DAILY PRN
Qty: 30 CAPSULE | Refills: 0 | Status: SHIPPED | OUTPATIENT
Start: 2020-01-27 | End: 2020-02-06

## 2020-01-27 NOTE — PROGRESS NOTES
Subjective:      Patient ID: Angelia Anderson is a 88 y.o. female.    Chief Complaint: Cough  Cough   This is a new problem. The current episode started in the past 7 days. The problem occurs hourly. The cough is non-productive. Associated symptoms include postnasal drip and rhinorrhea. Pertinent negatives include no chest pain, chills, ear congestion, ear pain, eye redness, fever, headaches, heartburn, hemoptysis, myalgias, nasal congestion, rash, sore throat, shortness of breath, sweats, weight loss or wheezing. Nothing aggravates the symptoms. She has tried a beta-agonist inhaler for the symptoms. The treatment provided moderate relief.     Review of Systems   Constitutional: Negative for chills, fever and weight loss.   HENT: Positive for postnasal drip and rhinorrhea. Negative for ear pain, sinus pain and sore throat.    Eyes: Negative for redness.   Respiratory: Positive for cough. Negative for hemoptysis, chest tightness, shortness of breath and wheezing.    Cardiovascular: Negative for chest pain, palpitations and leg swelling.   Gastrointestinal: Negative for constipation, diarrhea, heartburn, nausea and vomiting.   Genitourinary: Negative for difficulty urinating and dysuria.   Musculoskeletal: Negative for arthralgias and myalgias.   Skin: Negative for rash.   Neurological: Negative for dizziness and headaches.     Medication List with Changes/Refills   Current Medications    ASPIRIN 81 MG CHEW    aspirin 81 mg chewable tablet   Chew 1 tablet every day by oral route.    CARVEDILOL (COREG) 12.5 MG TABLET    1 tablet 2 (two) times daily.    DORZOLAMIDE-TIMOLOL 2-0.5% (COSOPT) 22.3-6.8 MG/ML OPHTHALMIC SOLUTION    INSTILL 1 DROP INTO RIGHT EYE TWICE A DAY    ERYTHROMYCIN (ROMYCIN) OPHTHALMIC OINTMENT    erythromycin 5 mg/gram (0.5 %) eye ointment    FERROUS SULFATE (IRON ORAL)    Take 10 mg by mouth once daily.    LORATADINE (CLARITIN) 10 MG TABLET    Take 1 tablet (10 mg total) by mouth once daily.     "MONTELUKAST (SINGULAIR) 10 MG TABLET    Take 1 tablet (10 mg total) by mouth every evening.    TAMSULOSIN (FLOMAX) 0.4 MG CAP    1 tablet once daily.    TRAZODONE (DESYREL) 100 MG TABLET    Take 1 tablet (100 mg total) by mouth every evening.      Objective:   BP (!) 164/80 (BP Location: Left arm, Patient Position: Sitting, BP Method: Medium (Automatic))   Pulse 67   Temp 97.1 °F (36.2 °C)   Ht 5' 4" (1.626 m)   Wt 68.9 kg (152 lb)   SpO2 96%   BMI 26.09 kg/m²    Estimated body mass index is 26.09 kg/m² as calculated from the following:    Height as of this encounter: 5' 4" (1.626 m).    Weight as of this encounter: 68.9 kg (152 lb).   Physical Exam  Lab Results   Component Value Date    AST 19 05/31/2019     05/31/2019    K 4.7 05/31/2019     05/31/2019    CREATININE 0.69 05/31/2019    BUN 13.5 05/31/2019    CO2 32 (H) 05/31/2019    TSH 5.80 (H) 05/31/2019      Assessment:      Problem List Items Addressed This Visit        Pulmonary    Cough - Primary    Relevant Medications    benzonatate (TESSALON) 100 MG capsule       Other    Allergic rhinitis    Relevant Medications    desloratadine (CLARINEX) 5 mg tablet    betamethasone acetate-betamethasone sodium phosphate injection 6 mg (Start on 1/27/2020  9:00 AM)           Plan:   Celestone injection 6 mg IM in clinic.  Clarinex and Tessalon perles 100 mg capsule tid for 10 days.         "

## 2020-02-19 DIAGNOSIS — J30.9 ALLERGIC RHINITIS, UNSPECIFIED SEASONALITY, UNSPECIFIED TRIGGER: ICD-10-CM

## 2020-02-19 RX ORDER — DESLORATADINE 5 MG/1
TABLET ORAL
Qty: 30 TABLET | Refills: 5 | Status: SHIPPED | OUTPATIENT
Start: 2020-02-19

## 2020-04-17 ENCOUNTER — PATIENT OUTREACH (OUTPATIENT)
Dept: ADMINISTRATIVE | Facility: HOSPITAL | Age: 85
End: 2020-04-17

## 2020-04-17 NOTE — PROGRESS NOTES
Health Maintenance Updated.  Care everywhere  search bar   Immunizations: Abstracted.  [facility]  [facility]      Lipid profile linked

## 2020-09-18 ENCOUNTER — HISTORICAL (OUTPATIENT)
Dept: ADMINISTRATIVE | Facility: HOSPITAL | Age: 85
End: 2020-09-18

## 2021-05-17 ENCOUNTER — PATIENT OUTREACH (OUTPATIENT)
Dept: ADMINISTRATIVE | Facility: CLINIC | Age: 86
End: 2021-05-17

## 2021-05-17 ENCOUNTER — EXTERNAL HOSPITAL ADMISSION (OUTPATIENT)
Dept: ADMINISTRATIVE | Facility: CLINIC | Age: 86
End: 2021-05-17
